# Patient Record
Sex: FEMALE | Race: WHITE | URBAN - METROPOLITAN AREA
[De-identification: names, ages, dates, MRNs, and addresses within clinical notes are randomized per-mention and may not be internally consistent; named-entity substitution may affect disease eponyms.]

---

## 2020-06-25 ENCOUNTER — OFFICE VISIT (OUTPATIENT)
Dept: URBAN - METROPOLITAN AREA SURGERY CENTER 16 | Facility: SURGERY CENTER | Age: 75
End: 2020-06-25

## 2020-09-04 ENCOUNTER — OFFICE VISIT (OUTPATIENT)
Dept: URBAN - METROPOLITAN AREA SURGERY CENTER 18 | Facility: SURGERY CENTER | Age: 75
End: 2020-09-04
Payer: MEDICARE

## 2020-09-04 ENCOUNTER — CLAIMS CREATED FROM THE CLAIM WINDOW (OUTPATIENT)
Dept: URBAN - METROPOLITAN AREA CLINIC 4 | Facility: CLINIC | Age: 75
End: 2020-09-04
Payer: MEDICARE

## 2020-09-04 DIAGNOSIS — K21.9 ACID REFLUX: ICD-10-CM

## 2020-09-04 DIAGNOSIS — K21.0 GASTRO-ESOPHAGEAL REFLUX DISEASE WITH ESOPHAGITIS: ICD-10-CM

## 2020-09-04 DIAGNOSIS — Z12.11 COLON CANCER SCREENING: ICD-10-CM

## 2020-09-04 DIAGNOSIS — D12.4 BENIGN NEOPLASM OF DESCENDING COLON: ICD-10-CM

## 2020-09-04 DIAGNOSIS — K22.8 COLUMNAR-LINED ESOPHAGUS: ICD-10-CM

## 2020-09-04 DIAGNOSIS — K31.89 OTHER DISEASES OF STOMACH AND DUODENUM: ICD-10-CM

## 2020-09-04 DIAGNOSIS — D10.30 BENIGN NEOPLASM OF UNSPECIFIED PART OF MOUTH: ICD-10-CM

## 2020-09-04 DIAGNOSIS — D12.2 BENIGN NEOPLASM OF ASCENDING COLON: ICD-10-CM

## 2020-09-04 DIAGNOSIS — K63.5 BENIGN COLON POLYP: ICD-10-CM

## 2020-09-04 PROCEDURE — 88342 IMHCHEM/IMCYTCHM 1ST ANTB: CPT | Performed by: PATHOLOGY

## 2020-09-04 PROCEDURE — 43239 EGD BIOPSY SINGLE/MULTIPLE: CPT | Performed by: INTERNAL MEDICINE

## 2020-09-04 PROCEDURE — G8907 PT DOC NO EVENTS ON DISCHARG: HCPCS | Performed by: INTERNAL MEDICINE

## 2020-09-04 PROCEDURE — 88305 TISSUE EXAM BY PATHOLOGIST: CPT | Performed by: PATHOLOGY

## 2020-09-04 PROCEDURE — 45385 COLONOSCOPY W/LESION REMOVAL: CPT | Performed by: INTERNAL MEDICINE

## 2020-09-04 PROCEDURE — 88312 SPECIAL STAINS GROUP 1: CPT | Performed by: PATHOLOGY

## 2020-09-04 PROCEDURE — 45380 COLONOSCOPY AND BIOPSY: CPT | Performed by: INTERNAL MEDICINE

## 2020-09-04 PROCEDURE — G9935 CANC NOT DETECTD DURING SRCN: HCPCS | Performed by: INTERNAL MEDICINE

## 2020-10-25 ENCOUNTER — LAB OUTSIDE AN ENCOUNTER (OUTPATIENT)
Dept: URBAN - METROPOLITAN AREA CLINIC 96 | Facility: CLINIC | Age: 75
End: 2020-10-25

## 2020-10-26 ENCOUNTER — OFFICE VISIT (OUTPATIENT)
Dept: URBAN - METROPOLITAN AREA CLINIC 96 | Facility: CLINIC | Age: 75
End: 2020-10-26
Payer: MEDICARE

## 2020-10-26 ENCOUNTER — WEB ENCOUNTER (OUTPATIENT)
Dept: URBAN - METROPOLITAN AREA CLINIC 96 | Facility: CLINIC | Age: 75
End: 2020-10-26

## 2020-10-26 DIAGNOSIS — Z80.0 FAMILY HISTORY OF PANCREATIC CANCER: ICD-10-CM

## 2020-10-26 DIAGNOSIS — Z86.010 PERSONAL HISTORY OF COLONIC POLYPS: ICD-10-CM

## 2020-10-26 DIAGNOSIS — K21.9 GASTROESOPHAGEAL REFLUX DISEASE WITHOUT ESOPHAGITIS: ICD-10-CM

## 2020-10-26 PROCEDURE — G9903 PT SCRN TBCO ID AS NON USER: HCPCS | Performed by: INTERNAL MEDICINE

## 2020-10-26 PROCEDURE — 99204 OFFICE O/P NEW MOD 45 MIN: CPT | Performed by: INTERNAL MEDICINE

## 2020-10-26 PROCEDURE — G8427 DOCREV CUR MEDS BY ELIG CLIN: HCPCS | Performed by: INTERNAL MEDICINE

## 2020-10-26 PROCEDURE — G8420 CALC BMI NORM PARAMETERS: HCPCS | Performed by: INTERNAL MEDICINE

## 2020-10-26 PROCEDURE — G8482 FLU IMMUNIZE ORDER/ADMIN: HCPCS | Performed by: INTERNAL MEDICINE

## 2020-10-26 PROCEDURE — 3017F COLORECTAL CA SCREEN DOC REV: CPT | Performed by: INTERNAL MEDICINE

## 2020-10-26 RX ORDER — PANTOPRAZOLE SODIUM 20 MG/1
1 TABLET TABLET, DELAYED RELEASE ORAL ONCE A DAY
Qty: 30 | OUTPATIENT
Start: 2020-10-26

## 2020-10-26 NOTE — HPI-TODAY'S VISIT:
This is a 75-year-old female referred by Dr. Zamora for follow-up of some GI issues.  I met the patient February 2020 for colon cancer screening.  She also had some history of indigestion and heartburn.  She had a CT scan done in September due to some indigestion and her dad had pancreatic cancer.  Her son also had kidney cancer but survived this.  The CT showed possible inflammation of the stomach so Dr. Zamora wanted her to have an upper endoscopy at the same time of colonoscopy.  Patient had denied symptoms of GERD or dysphagia or heartburn.  Patient had an EGD on September 4, 2020 and this revealed a 4 mm nodule in the proximal esophagus this was removed.  The esophagus was otherwise normal.  There was some mild gastric erythema.  Biopsies were taken.  Colonoscopy was done on the same day and revealed internal hemorrhoids as well as a 6 mm ascending colon polyp that was removed with a cold snare and a 4 mm descending polyp that was removed with cold biopsy.  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  Her polyps were hyperplastic.  For these findings I recommended a follow-up colonoscopy in 5 years or she would be due in September 2025.  Patient did have some reflux changes so I did recommend that she follow-up with me. Pt has mutiple rescue dogs and has accupuncture and chirocpractic care.

## 2020-11-18 ENCOUNTER — ERX REFILL RESPONSE (OUTPATIENT)
Dept: URBAN - METROPOLITAN AREA CLINIC 96 | Facility: CLINIC | Age: 75
End: 2020-11-18

## 2020-11-18 RX ORDER — PANTOPRAZOLE SODIUM 20 MG/1
1 TABLET TABLET, DELAYED RELEASE ORAL ONCE A DAY
Qty: 30 | Refills: 0

## 2020-12-13 ENCOUNTER — ERX REFILL RESPONSE (OUTPATIENT)
Dept: URBAN - METROPOLITAN AREA CLINIC 96 | Facility: CLINIC | Age: 75
End: 2020-12-13

## 2020-12-13 RX ORDER — PANTOPRAZOLE SODIUM 20 MG/1
1 TABLET TABLET, DELAYED RELEASE ORAL ONCE A DAY
Qty: 30 | Refills: 0

## 2021-01-06 ENCOUNTER — ERX REFILL RESPONSE (OUTPATIENT)
Dept: URBAN - METROPOLITAN AREA CLINIC 96 | Facility: CLINIC | Age: 76
End: 2021-01-06

## 2021-01-06 RX ORDER — PANTOPRAZOLE SODIUM 40 MG/1
1 TABLET TABLET, DELAYED RELEASE ORAL ONCE A DAY
Qty: 90 TABLET | Refills: 3

## 2021-04-27 ENCOUNTER — LAB OUTSIDE AN ENCOUNTER (OUTPATIENT)
Dept: URBAN - METROPOLITAN AREA CLINIC 96 | Facility: CLINIC | Age: 76
End: 2021-04-27

## 2021-04-28 ENCOUNTER — OFFICE VISIT (OUTPATIENT)
Dept: URBAN - METROPOLITAN AREA CLINIC 96 | Facility: CLINIC | Age: 76
End: 2021-04-28
Payer: MEDICARE

## 2021-04-28 DIAGNOSIS — K59.00 CONSTIPATION, UNSPECIFIED CONSTIPATION TYPE: ICD-10-CM

## 2021-04-28 DIAGNOSIS — Z86.010 PERSONAL HISTORY OF COLONIC POLYPS: ICD-10-CM

## 2021-04-28 DIAGNOSIS — Z80.0 FAMILY HISTORY OF PANCREATIC CANCER: ICD-10-CM

## 2021-04-28 DIAGNOSIS — K21.9 GASTROESOPHAGEAL REFLUX DISEASE WITHOUT ESOPHAGITIS: ICD-10-CM

## 2021-04-28 PROCEDURE — 99204 OFFICE O/P NEW MOD 45 MIN: CPT | Performed by: INTERNAL MEDICINE

## 2021-04-28 RX ORDER — PANTOPRAZOLE SODIUM 20 MG/1
1 TABLET TABLET, DELAYED RELEASE ORAL ONCE A DAY
Qty: 30 | Refills: 0 | Status: ACTIVE | COMMUNITY

## 2021-04-28 RX ORDER — PANTOPRAZOLE SODIUM 40 MG/1
1 TABLET TABLET, DELAYED RELEASE ORAL ONCE A DAY
Qty: 90 TABLET | Refills: 3 | Status: ACTIVE | COMMUNITY

## 2021-04-28 NOTE — HPI-TODAY'S VISIT:
This is a 75-year-old female referred by Dr. Zamora for follow-up of some GI issues and a copy of this note will be sent to the referring physician. I met the patient February 2020 for colon cancer screening.  She also had some history of indigestion and heartburn.  She had a CT scan done in September due to some indigestion and her dad had pancreatic cancer.  Her son also had kidney cancer but survived this.  The CT showed possible inflammation of the stomach so Dr. Zamora wanted her to have an upper endoscopy at the same time of colonoscopy.  Patient had denied symptoms of GERD or dysphagia or heartburn.  Patient had an EGD on September 4, 2020 and this revealed a 4 mm nodule in the proximal esophagus this was removed.  The esophagus was otherwise normal.  There was some mild gastric erythema.  Biopsies were taken.  Colonoscopy was done on the same day and revealed internal hemorrhoids as well as a 6 mm ascending colon polyp that was removed with a cold snare and a 4 mm descending polyp that was removed with cold biopsy.  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  Her polyps were hyperplastic.  For these findings I recommended a follow-up colonoscopy in 5 years or she would be due in September 2025.  Patient did have some reflux changes so I did recommend that she follow-up with me. Pt has mutiple rescue dogs and has accupuncture and chirocpractic care.

## 2022-07-22 ENCOUNTER — OFFICE VISIT (OUTPATIENT)
Dept: URBAN - METROPOLITAN AREA TELEHEALTH 2 | Facility: TELEHEALTH | Age: 77
End: 2022-07-22
Payer: MEDICARE

## 2022-07-22 VITALS — HEIGHT: 61 IN | WEIGHT: 102 LBS | BODY MASS INDEX: 19.26 KG/M2

## 2022-07-22 DIAGNOSIS — K59.09 CHANGE IN BOWEL MOVEMENTS INTERMITTENT CONSTIPATION. URGENCY IN THE MORNING.: ICD-10-CM

## 2022-07-22 DIAGNOSIS — K59.00 CONSTIPATION, UNSPECIFIED CONSTIPATION TYPE: ICD-10-CM

## 2022-07-22 DIAGNOSIS — Z80.0 FAMILY HISTORY OF PANCREATIC CANCER: ICD-10-CM

## 2022-07-22 DIAGNOSIS — Z86.010 PERSONAL HISTORY OF COLONIC POLYPS: ICD-10-CM

## 2022-07-22 DIAGNOSIS — K21.9 GASTROESOPHAGEAL REFLUX DISEASE WITHOUT ESOPHAGITIS: ICD-10-CM

## 2022-07-22 PROCEDURE — 99213 OFFICE O/P EST LOW 20 MIN: CPT | Performed by: PHYSICIAN ASSISTANT

## 2022-07-22 RX ORDER — PANTOPRAZOLE SODIUM 40 MG/1
1 TABLET TABLET, DELAYED RELEASE ORAL ONCE A DAY
Qty: 90 TABLET | Refills: 3 | Status: ON HOLD | COMMUNITY

## 2022-07-22 RX ORDER — PANTOPRAZOLE SODIUM 20 MG/1
1 TABLET TABLET, DELAYED RELEASE ORAL ONCE A DAY
Qty: 30 | Refills: 0 | Status: ON HOLD | COMMUNITY

## 2022-07-22 NOTE — HPI-TODAY'S VISIT:
This is a 77yoF who presents for eval of constipation, last seen 4/2021. EGD 9/4/2020  revealed a 4 mm squamous papilloma in the proximal esophagus this was removed and gastritis. Bx neg celiac and HP but showed some reflux esophagitis.  She took PPI short term and sx resolved. She is off reflux meds and denies GERD sx--controls this with diet. No dysphagia, N/V.  She had a Colonoscopy 9/2020 that revealed internal hemorrhoids as well as a 6 mm ascending colon polyp and a 4 mm descending polyp-both hyperplastic, due in September 2025 for surveillance.  Has hx of mild constipation and used to use miralax prn but she stopped using this (for no reason) and in April had no BM for 3 days and she went to the ER [facility] and I reviewed the CT scan which showed stool in the rectum and she had to be disimpacted. She was told to use colace which she did for only a few days and then has not been taking anything since. At baseline takes BMs every day but small and hard stools and incomplete evacuation. Admits diet low in fiber and does not want to work on this as very picky. She notes earlier this week she went 3 day without BMs and then took a laxative and an enema and was able to have a BM. Denies hematochezia or melena . She has bloating with the constipation. When she used to take miralax it helped.   She has 2 kids--vaginally births--+episiotomy--denies urinary leakage  She has had her thyroid checked with PCP

## 2023-01-30 ENCOUNTER — ERX REFILL RESPONSE (OUTPATIENT)
Dept: URBAN - METROPOLITAN AREA CLINIC 96 | Facility: CLINIC | Age: 78
End: 2023-01-30

## 2023-01-30 ENCOUNTER — LAB OUTSIDE AN ENCOUNTER (OUTPATIENT)
Dept: URBAN - METROPOLITAN AREA CLINIC 96 | Facility: CLINIC | Age: 78
End: 2023-01-30

## 2023-01-30 ENCOUNTER — OFFICE VISIT (OUTPATIENT)
Dept: URBAN - METROPOLITAN AREA CLINIC 96 | Facility: CLINIC | Age: 78
End: 2023-01-30
Payer: MEDICARE

## 2023-01-30 VITALS
BODY MASS INDEX: 19.41 KG/M2 | DIASTOLIC BLOOD PRESSURE: 64 MMHG | HEIGHT: 61 IN | TEMPERATURE: 98.1 F | WEIGHT: 102.8 LBS | SYSTOLIC BLOOD PRESSURE: 119 MMHG | HEART RATE: 78 BPM

## 2023-01-30 DIAGNOSIS — K59.04 CHRONIC IDIOPATHIC CONSTIPATION: ICD-10-CM

## 2023-01-30 DIAGNOSIS — R10.13 EPIGASTRIC PAIN: ICD-10-CM

## 2023-01-30 DIAGNOSIS — Z86.010 PERSONAL HISTORY OF COLONIC POLYPS: ICD-10-CM

## 2023-01-30 DIAGNOSIS — Z80.0 FAMILY HISTORY OF PANCREATIC CANCER: ICD-10-CM

## 2023-01-30 DIAGNOSIS — K21.9 GASTROESOPHAGEAL REFLUX DISEASE WITHOUT ESOPHAGITIS: ICD-10-CM

## 2023-01-30 PROBLEM — 428283002: Status: ACTIVE | Noted: 2020-10-25

## 2023-01-30 PROBLEM — 79922009: Status: ACTIVE | Noted: 2023-01-30

## 2023-01-30 PROBLEM — 266435005: Status: ACTIVE | Noted: 2020-10-26

## 2023-01-30 PROBLEM — 14760008: Status: ACTIVE | Noted: 2021-04-28

## 2023-01-30 PROBLEM — 82934008: Status: ACTIVE | Noted: 2023-01-30

## 2023-01-30 PROCEDURE — 99204 OFFICE O/P NEW MOD 45 MIN: CPT | Performed by: INTERNAL MEDICINE

## 2023-01-30 RX ORDER — LUBIPROSTONE 8 UG/1
1 CAPSULE CAPSULE, GELATIN COATED ORAL TWICE A DAY
Qty: 180 CAPSULE | Refills: 1 | OUTPATIENT
Start: 2023-01-30 | End: 2023-07-29

## 2023-01-30 RX ORDER — PANTOPRAZOLE SODIUM 20 MG/1
1 TABLET TABLET, DELAYED RELEASE ORAL ONCE A DAY
Qty: 30 | Refills: 0 | COMMUNITY

## 2023-01-30 RX ORDER — PANTOPRAZOLE SODIUM 40 MG/1
1 TABLET TABLET, DELAYED RELEASE ORAL ONCE A DAY
Qty: 90 TABLET | Refills: 3 | COMMUNITY

## 2023-01-30 RX ORDER — LUBIPROSTONE 8 UG/1
1 CAPSULE CAPSULE, GELATIN COATED ORAL TWICE A DAY
Qty: 180 CAPSULE | Refills: 1 | OUTPATIENT

## 2023-01-30 NOTE — HPI-TODAY'S VISIT:
This is a 77yoF who presents for Gi f/u and a copy will be sent to the ref provider.  Pt was seen by our PA Kera in 7/2022 for severe constipation.  EGD 9/4/2020  revealed a 4 mm squamous papilloma in the proximal esophagus this was removed and gastritis. Bx neg celiac and HP but showed some reflux esophagitis.  She took PPI short term and sx resolved. She is off reflux meds and denies GERD sx--controls this with diet. No dysphagia, N/V. She had a Colonoscopy 9/2020 that revealed internal hemorrhoids as well as a 6 mm ascending colon polyp and a 4 mm descending polyp-both hyperplastic, due in September 2025 for surveillance. Has hx of mild constipation and used to use miralax prn but she stopped using this (for no reason) and in April had no BM for 3 days and she went to the ER at [facility] and I reviewed the CT scan which showed stool in the rectum and she had to be disimpacted. She was told to use colace which she did for only a few days and then has not been taking anything since. At baseline takes BMs every day but small and hard stools and incomplete evacuation. Admits diet low in fiber and does not want to work on this as very picky. She notes earlier this week she went 3 day without BMs and then took a laxative and an enema and was able to have a BM. Denies hematochezia or melena . She has bloating with the constipation. When she used to take miralax it helped.  She has 2 kids--vaginally births--+episiotomy--denies urinary leakage  She has had her thyroid checked with PCP Pt was seen by Kera in 7/2022 and wanted to try miralax for her constip issues. ARM was mentioned. Pt brought in an MRI Report.  This was ordered by Dr. Zamora for abdominal pain and compared to the CT that Dr. Zamora did on April 8, 2022.  MRI shows no acute process in the abdomen the stomach is mildly thickened but this may be due to under distention. Pt takes miralx and metamucil-  Pt had chr indigestion and took pantoprazole and then went off it.

## 2023-02-14 ENCOUNTER — TELEPHONE ENCOUNTER (OUTPATIENT)
Dept: URBAN - METROPOLITAN AREA CLINIC 96 | Facility: CLINIC | Age: 78
End: 2023-02-14

## 2023-02-17 ENCOUNTER — CLAIMS CREATED FROM THE CLAIM WINDOW (OUTPATIENT)
Dept: URBAN - METROPOLITAN AREA CLINIC 4 | Facility: CLINIC | Age: 78
End: 2023-02-17
Payer: MEDICARE

## 2023-02-17 ENCOUNTER — CLAIMS CREATED FROM THE CLAIM WINDOW (OUTPATIENT)
Dept: URBAN - METROPOLITAN AREA SURGERY CENTER 18 | Facility: SURGERY CENTER | Age: 78
End: 2023-02-17

## 2023-02-17 ENCOUNTER — CLAIMS CREATED FROM THE CLAIM WINDOW (OUTPATIENT)
Dept: URBAN - METROPOLITAN AREA SURGERY CENTER 18 | Facility: SURGERY CENTER | Age: 78
End: 2023-02-17
Payer: MEDICARE

## 2023-02-17 ENCOUNTER — TELEPHONE ENCOUNTER (OUTPATIENT)
Dept: URBAN - METROPOLITAN AREA CLINIC 92 | Facility: CLINIC | Age: 78
End: 2023-02-17

## 2023-02-17 DIAGNOSIS — K29.70 GASTRITIS, UNSPECIFIED, WITHOUT BLEEDING: ICD-10-CM

## 2023-02-17 DIAGNOSIS — K22.89 DILATATION OF ESOPHAGUS: ICD-10-CM

## 2023-02-17 DIAGNOSIS — K29.60 ADENOPAPILLOMATOSIS GASTRICA: ICD-10-CM

## 2023-02-17 PROCEDURE — 88312 SPECIAL STAINS GROUP 1: CPT | Performed by: PATHOLOGY

## 2023-02-17 PROCEDURE — G8907 PT DOC NO EVENTS ON DISCHARG: HCPCS | Performed by: INTERNAL MEDICINE

## 2023-02-17 PROCEDURE — 43239 EGD BIOPSY SINGLE/MULTIPLE: CPT | Performed by: INTERNAL MEDICINE

## 2023-02-17 PROCEDURE — 88305 TISSUE EXAM BY PATHOLOGIST: CPT | Performed by: PATHOLOGY

## 2023-02-17 RX ORDER — LUBIPROSTONE 8 UG/1
1 CAPSULE CAPSULE, GELATIN COATED ORAL TWICE A DAY
Qty: 180 CAPSULE | Refills: 3 | OUTPATIENT
Start: 2023-02-17 | End: 2024-02-12

## 2023-02-17 RX ORDER — LUBIPROSTONE 8 UG/1
1 CAPSULE CAPSULE, GELATIN COATED ORAL TWICE A DAY
Qty: 180 CAPSULE | Refills: 1 | Status: ACTIVE | COMMUNITY

## 2023-02-17 RX ORDER — PANTOPRAZOLE SODIUM 20 MG/1
1 TABLET TABLET, DELAYED RELEASE ORAL ONCE A DAY
Qty: 30 | Refills: 0 | COMMUNITY

## 2023-02-17 RX ORDER — PANTOPRAZOLE SODIUM 40 MG/1
1 TABLET TABLET, DELAYED RELEASE ORAL ONCE A DAY
Qty: 90 TABLET | Refills: 3 | COMMUNITY

## 2023-04-04 ENCOUNTER — DASHBOARD ENCOUNTERS (OUTPATIENT)
Age: 78
End: 2023-04-04

## 2023-04-05 ENCOUNTER — OFFICE VISIT (OUTPATIENT)
Dept: URBAN - METROPOLITAN AREA CLINIC 96 | Facility: CLINIC | Age: 78
End: 2023-04-05
Payer: MEDICARE

## 2023-04-05 VITALS — WEIGHT: 102 LBS | HEIGHT: 61 IN | BODY MASS INDEX: 19.26 KG/M2

## 2023-04-05 DIAGNOSIS — Z80.0 FAMILY HISTORY OF PANCREATIC CANCER: ICD-10-CM

## 2023-04-05 DIAGNOSIS — Z86.010 PERSONAL HISTORY OF COLONIC POLYPS: ICD-10-CM

## 2023-04-05 DIAGNOSIS — K59.04 CHRONIC IDIOPATHIC CONSTIPATION: ICD-10-CM

## 2023-04-05 DIAGNOSIS — R10.13 EPIGASTRIC PAIN: ICD-10-CM

## 2023-04-05 DIAGNOSIS — K21.9 GASTROESOPHAGEAL REFLUX DISEASE WITHOUT ESOPHAGITIS: ICD-10-CM

## 2023-04-05 PROCEDURE — 99204 OFFICE O/P NEW MOD 45 MIN: CPT | Performed by: INTERNAL MEDICINE

## 2023-04-05 RX ORDER — LUBIPROSTONE 8 UG/1
1 CAPSULE CAPSULE, GELATIN COATED ORAL TWICE A DAY
Qty: 180 CAPSULE | Refills: 1 | OUTPATIENT

## 2023-04-05 RX ORDER — PANTOPRAZOLE SODIUM 20 MG/1
1 TABLET TABLET, DELAYED RELEASE ORAL ONCE A DAY
Qty: 30 | Refills: 0 | Status: ACTIVE | COMMUNITY

## 2023-04-05 RX ORDER — LUBIPROSTONE 8 UG/1
1 CAPSULE CAPSULE, GELATIN COATED ORAL TWICE A DAY
Qty: 180 CAPSULE | Refills: 1 | Status: ACTIVE | COMMUNITY

## 2023-04-05 RX ORDER — PANTOPRAZOLE SODIUM 40 MG/1
1 TABLET TABLET, DELAYED RELEASE ORAL ONCE A DAY
Qty: 90 TABLET | Refills: 3 | Status: ACTIVE | COMMUNITY

## 2023-04-05 RX ORDER — LUBIPROSTONE 8 UG/1
1 CAPSULE CAPSULE, GELATIN COATED ORAL TWICE A DAY
Qty: 180 CAPSULE | Refills: 3 | Status: ACTIVE | COMMUNITY
Start: 2023-02-17 | End: 2024-02-12

## 2023-04-05 NOTE — HPI-TODAY'S VISIT:
This is a 78yoF who presents for Gi f/u and a copy will be sent to the ref provider, Dr Yuki Zamora. Pt was seen by our PA Kera in 7/2022 for severe constipation.  EGD 9/4/2020  revealed a 4 mm squamous papilloma in the proximal esophagus this was removed and gastritis. Bx neg celiac and HP but showed some reflux esophagitis.  She took PPI short term and sx resolved. She is off reflux meds and denies GERD sx--controls this with diet. No dysphagia, N/V. She had a Colonoscopy 9/2020 that revealed internal hemorrhoids as well as a 6 mm ascending colon polyp and a 4 mm descending polyp-both hyperplastic, due in September 2025 for surveillance. Has hx of mild constipation and used to use miralax prn but she stopped using this (for no reason) and in April had no BM for 3 days and she went to the ER at [facility] and I reviewed the CT scan which showed stool in the rectum and she had to be disimpacted. She was told to use colace which she did for only a few days and then has not been taking anything since. At baseline takes BMs every day but small and hard stools and incomplete evacuation. Admits diet low in fiber and does not want to work on this as very picky. She notes earlier this week she went 3 day without BMs and then took a laxative and an enema and was able to have a BM. Denies hematochezia or melena . She has bloating with the constipation. When she used to take miralax it helped.  She has 2 kids--vaginally births--+episiotomy--denies urinary leakage  She has had her thyroid checked with PCP Pt was seen by Kera in 7/2022 and wanted to try miralax for her constip issues. ARM was mentioned. Pt brought in an MRI Report.  This was ordered by Dr. Zamora for abdominal pain and compared to the CT that Dr. Zamora did on April 8, 2022.  MRI shows no acute process in the abdomen the stomach is mildly thickened but this may be due to under distention. Pt takes miralx and metamucil-  Pt had chr indigestion and took pantoprazole and then went off it. I did an EGD on the patient on February 17, 2023 to evaluate due to an MRI that mentioned a thickened stomach.  This revealed esophagitis otherwise normal exam.  Biopsies were sent.  Pathology showed chronic gastritis of the antrum but no H. pylori, chronic gastritis of the body but no H. pylori and esophageal biopsy showed no abnormalities both in the distal and mid areas of the esophagus. Pt got 90 days of amitiza 8ug so using and she goes once a day but its not complete.  She denies  reflux at all, has not wanted to be on PPIs and nows pros and cons.

## 2023-05-25 ENCOUNTER — TELEPHONE ENCOUNTER (OUTPATIENT)
Dept: URBAN - METROPOLITAN AREA MEDICAL CENTER 28 | Facility: MEDICAL CENTER | Age: 78
End: 2023-05-25

## 2023-06-01 ENCOUNTER — OFFICE VISIT (OUTPATIENT)
Dept: URBAN - METROPOLITAN AREA MEDICAL CENTER 28 | Facility: MEDICAL CENTER | Age: 78
End: 2023-06-01

## 2023-06-02 ENCOUNTER — ERX REFILL RESPONSE (OUTPATIENT)
Dept: URBAN - METROPOLITAN AREA CLINIC 92 | Facility: CLINIC | Age: 78
End: 2023-06-02

## 2023-06-02 RX ORDER — LUBIPROSTONE 8 UG/1
1 CAPSULE CAPSULE, GELATIN COATED ORAL TWICE A DAY
Qty: 180 CAPSULE | Refills: 1 | OUTPATIENT

## 2024-07-09 ENCOUNTER — LAB OUTSIDE AN ENCOUNTER (OUTPATIENT)
Dept: URBAN - METROPOLITAN AREA CLINIC 96 | Facility: CLINIC | Age: 79
End: 2024-07-09

## 2024-07-10 ENCOUNTER — LAB OUTSIDE AN ENCOUNTER (OUTPATIENT)
Dept: URBAN - METROPOLITAN AREA CLINIC 96 | Facility: CLINIC | Age: 79
End: 2024-07-10

## 2024-07-10 ENCOUNTER — OFFICE VISIT (OUTPATIENT)
Dept: URBAN - METROPOLITAN AREA CLINIC 96 | Facility: CLINIC | Age: 79
End: 2024-07-10
Payer: MEDICARE

## 2024-07-10 VITALS
HEIGHT: 61 IN | HEART RATE: 66 BPM | TEMPERATURE: 97.7 F | WEIGHT: 103 LBS | SYSTOLIC BLOOD PRESSURE: 143 MMHG | BODY MASS INDEX: 19.45 KG/M2 | DIASTOLIC BLOOD PRESSURE: 70 MMHG

## 2024-07-10 DIAGNOSIS — R10.84 GENERALIZED ABDOMINAL PAIN: ICD-10-CM

## 2024-07-10 DIAGNOSIS — Z80.0 FAMILY HISTORY OF PANCREATIC CANCER: ICD-10-CM

## 2024-07-10 DIAGNOSIS — R10.13 EPIGASTRIC PAIN: ICD-10-CM

## 2024-07-10 DIAGNOSIS — K59.04 CHRONIC IDIOPATHIC CONSTIPATION: ICD-10-CM

## 2024-07-10 DIAGNOSIS — K21.9 GASTROESOPHAGEAL REFLUX DISEASE WITHOUT ESOPHAGITIS: ICD-10-CM

## 2024-07-10 DIAGNOSIS — Z86.010 PERSONAL HISTORY OF COLONIC POLYPS: ICD-10-CM

## 2024-07-10 PROCEDURE — 99204 OFFICE O/P NEW MOD 45 MIN: CPT | Performed by: INTERNAL MEDICINE

## 2024-07-10 RX ORDER — PANTOPRAZOLE SODIUM 40 MG/1
1 TABLET TABLET, DELAYED RELEASE ORAL ONCE A DAY
Qty: 90 TABLET | Refills: 3 | Status: ON HOLD | COMMUNITY

## 2024-07-10 RX ORDER — LUBIPROSTONE 8 UG/1
1 CAPSULE CAPSULE, GELATIN COATED ORAL TWICE A DAY
Qty: 180 CAPSULE | Refills: 1 | Status: ON HOLD | COMMUNITY

## 2024-07-10 RX ORDER — LUBIPROSTONE 8 UG/1
1 CAPSULE CAPSULE, GELATIN COATED ORAL TWICE A DAY
Qty: 180 CAPSULE | Refills: 1 | OUTPATIENT

## 2024-07-10 RX ORDER — MELOXICAM 15 MG/1
1 TABLET TABLET ORAL ONCE A DAY
Status: ACTIVE | COMMUNITY

## 2024-07-10 NOTE — HPI-TODAY'S VISIT:
This is a 79yoF who presents for Gi f/u and a copy will be sent to the ref provider, Dr Yuki Zamora. Pt with hx of severe constipation and gerd was last seen in april 2023 and was on amitiza at the time. We discussed doing ARM study to r/o pelvic floor dysfunction. EGD 9/4/2020  revealed a 4 mm squamous papilloma in the proximal esophagus this was removed and gastritis. Bx neg celiac and HP but showed some reflux esophagitis.  She took PPI short term and sx resolved. She is off reflux meds and denies GERD sx--controls this with diet. No dysphagia, N/V. She had a Colonoscopy 9/2020 that revealed internal hemorrhoids as well as a 6 mm ascending colon polyp and a 4 mm descending polyp-both hyperplastic, due in September 2025 for surveillance. I did fu EGD on the patient on February 17, 2023 to evaluate due to an MRI that mentioned a thickened stomach.  This revealed esophagitis otherwise normal exam.  Biopsies were sent.  Pathology showed chronic gastritis of the antrum but no H. pylori, chronic gastritis of the body but no H. pylori and esophageal biopsy showed no abnormalities both in the distal and mid areas of the esophagus.  Pt takes meloxicam for her knee. Pt admits to having stopped amitiza and she is bloated and passing hard stools. She thinks amitiza works but has diarrhea sometimes from it so stopped. Pts dad had pancreatic cancer. Pt has thought about having a CT scan just to ensure no other issues. Pts son is a radiologist and had kidney stone. Pt had genetic testing and negative. Pt had failed colace and miralax.

## 2024-07-11 ENCOUNTER — TELEPHONE ENCOUNTER (OUTPATIENT)
Dept: URBAN - METROPOLITAN AREA CLINIC 96 | Facility: CLINIC | Age: 79
End: 2024-07-11

## 2024-07-15 ENCOUNTER — LAB OUTSIDE AN ENCOUNTER (OUTPATIENT)
Dept: URBAN - METROPOLITAN AREA CLINIC 96 | Facility: CLINIC | Age: 79
End: 2024-07-15

## 2024-07-15 LAB
CREATININE POC: 0.7
PERFORMING LAB: (no result)

## 2024-07-16 ENCOUNTER — TELEPHONE ENCOUNTER (OUTPATIENT)
Dept: URBAN - METROPOLITAN AREA CLINIC 96 | Facility: CLINIC | Age: 79
End: 2024-07-16

## 2024-07-18 ENCOUNTER — TELEPHONE ENCOUNTER (OUTPATIENT)
Dept: URBAN - METROPOLITAN AREA CLINIC 96 | Facility: CLINIC | Age: 79
End: 2024-07-18

## 2024-07-18 RX ORDER — LINACLOTIDE 72 UG/1
1 CAPSULE AT LEAST 30 MINUTES BEFORE THE FIRST MEAL OF THE DAY ON AN EMPTY STOMACH CAPSULE, GELATIN COATED ORAL ONCE A DAY
Qty: 90 CAPSULES | Refills: 3 | OUTPATIENT
Start: 2024-07-18 | End: 2025-07-13

## 2024-07-19 ENCOUNTER — TELEPHONE ENCOUNTER (OUTPATIENT)
Dept: URBAN - METROPOLITAN AREA CLINIC 96 | Facility: CLINIC | Age: 79
End: 2024-07-19

## 2024-07-22 ENCOUNTER — TELEPHONE ENCOUNTER (OUTPATIENT)
Dept: URBAN - METROPOLITAN AREA CLINIC 96 | Facility: CLINIC | Age: 79
End: 2024-07-22

## 2024-10-10 ENCOUNTER — OFFICE VISIT (OUTPATIENT)
Dept: URBAN - METROPOLITAN AREA CLINIC 92 | Facility: CLINIC | Age: 79
End: 2024-10-10

## 2024-11-06 ENCOUNTER — OFFICE VISIT (OUTPATIENT)
Dept: URBAN - METROPOLITAN AREA TELEHEALTH 2 | Facility: TELEHEALTH | Age: 79
End: 2024-11-06
Payer: MEDICARE

## 2024-11-06 VITALS — BODY MASS INDEX: 19.45 KG/M2 | WEIGHT: 103 LBS | HEIGHT: 61 IN

## 2024-11-06 DIAGNOSIS — K59.04 CHRONIC IDIOPATHIC CONSTIPATION: ICD-10-CM

## 2024-11-06 DIAGNOSIS — Z80.0 FAMILY HISTORY OF PANCREATIC CANCER: ICD-10-CM

## 2024-11-06 DIAGNOSIS — Z86.0102 HISTORY OF HYPERPLASTIC POLYP OF COLON: ICD-10-CM

## 2024-11-06 DIAGNOSIS — K21.9 GASTROESOPHAGEAL REFLUX DISEASE WITHOUT ESOPHAGITIS: ICD-10-CM

## 2024-11-06 PROCEDURE — 99204 OFFICE O/P NEW MOD 45 MIN: CPT | Performed by: INTERNAL MEDICINE

## 2024-11-06 PROCEDURE — 99214 OFFICE O/P EST MOD 30 MIN: CPT | Performed by: INTERNAL MEDICINE

## 2024-11-06 RX ORDER — MELOXICAM 15 MG/1
1 TABLET TABLET ORAL ONCE A DAY
Status: ACTIVE | COMMUNITY

## 2024-11-06 RX ORDER — PANTOPRAZOLE SODIUM 40 MG/1
1 TABLET TABLET, DELAYED RELEASE ORAL ONCE A DAY
Qty: 90 TABLET | Refills: 3 | Status: ACTIVE | COMMUNITY

## 2024-11-06 RX ORDER — LINACLOTIDE 72 UG/1
1 CAPSULE AT LEAST 30 MINUTES BEFORE THE FIRST MEAL OF THE DAY ON AN EMPTY STOMACH CAPSULE, GELATIN COATED ORAL ONCE A DAY
Qty: 90 CAPSULES | Refills: 3 | Status: ACTIVE | COMMUNITY
Start: 2024-07-18 | End: 2025-07-13

## 2024-11-06 NOTE — HPI-TODAY'S VISIT:
This is a 79yoF who presents for Gi f/u and a copy will be sent to the ref provider, Dr Yuki Zamora. Pt with hx of severe constipation and gerd was last seen in april 2023 and was on amitiza at the time. We discussed doing ARM study to r/o pelvic floor dysfunction. EGD 9/4/2020  revealed a 4 mm squamous papilloma in the proximal esophagus this was removed and gastritis. Bx neg celiac and HP but showed some reflux esophagitis.  She took PPI short term and sx resolved. She is off reflux meds and denies GERD sx--controls this with diet. No dysphagia, N/V. She had a Colonoscopy 9/2020 that revealed internal hemorrhoids as well as a 6 mm ascending colon polyp and a 4 mm descending polyp-both hyperplastic, due in September 2025 for surveillance. I did fu EGD on the patient on February 17, 2023 to evaluate due to an MRI that mentioned a thickened stomach.  This revealed esophagitis otherwise normal exam.  Biopsies were sent.  Pathology showed chronic gastritis of the antrum but no H. pylori, chronic gastritis of the body but no H. pylori and esophageal biopsy showed no abnormalities both in the distal and mid areas of the esophagus.  Pt takes meloxicam for her knee. Pt admits to having stopped amitiza and she is bloated and passing hard stools.  She thinks amitiza works but has diarrhea sometimes from it so stopped. Pts dad had pancreatic cancer. Pt has thought about having a CT scan just to ensure no other issues. Pts son is a radiologist and had kidney stone. Pt had genetic testing and negative. Pt had failed colace and miralax. Patient had a CT scan done on July 15, 2024 and this revealed no abnormalities except for some degenerative disc disease of the lumbar spine.  Patient was to try Linzess after her last visit because she felt that Amitiza was not working and she failed over-the-counter meds for her chronic constipation. Pt did not get lubiprostone 8ug and she takes it once a day and goes with harder stools (debora) but at least she is evacuating more than when she tool otc laxatives. Pt goes everyday. Pt is taking meloxicam for arthritis and she is on panto. Pt takes meloxicam for her arthritis.

## 2025-03-21 ENCOUNTER — LAB OUTSIDE AN ENCOUNTER (OUTPATIENT)
Dept: URBAN - METROPOLITAN AREA CLINIC 96 | Facility: CLINIC | Age: 80
End: 2025-03-21

## 2025-03-21 ENCOUNTER — OFFICE VISIT (OUTPATIENT)
Dept: URBAN - METROPOLITAN AREA CLINIC 96 | Facility: CLINIC | Age: 80
End: 2025-03-21
Payer: MEDICARE

## 2025-03-21 DIAGNOSIS — K21.9 GASTROESOPHAGEAL REFLUX DISEASE WITHOUT ESOPHAGITIS: ICD-10-CM

## 2025-03-21 DIAGNOSIS — R10.84 GENERALIZED ABDOMINAL PAIN: ICD-10-CM

## 2025-03-21 DIAGNOSIS — K59.04 CHRONIC IDIOPATHIC CONSTIPATION: ICD-10-CM

## 2025-03-21 DIAGNOSIS — Z86.0102 PERSONAL HISTORY OF HYPERPLASTIC COLON POLYPS: ICD-10-CM

## 2025-03-21 DIAGNOSIS — Z80.0 FAMILY HISTORY OF PANCREATIC CANCER: ICD-10-CM

## 2025-03-21 PROCEDURE — 99214 OFFICE O/P EST MOD 30 MIN: CPT | Performed by: INTERNAL MEDICINE

## 2025-03-21 PROCEDURE — 99204 OFFICE O/P NEW MOD 45 MIN: CPT | Performed by: INTERNAL MEDICINE

## 2025-03-21 RX ORDER — PANTOPRAZOLE SODIUM 40 MG/1
1 TABLET TABLET, DELAYED RELEASE ORAL ONCE A DAY
Qty: 90 TABLET | Refills: 3 | Status: ACTIVE | COMMUNITY

## 2025-03-21 RX ORDER — MELOXICAM 15 MG/1
1 TABLET TABLET ORAL ONCE A DAY
Status: ACTIVE | COMMUNITY

## 2025-03-21 NOTE — HPI-TODAY'S VISIT:
This is a 79yoF who presents for Gi f/u and a copy will be sent to the ref provider, Dr Yuki Zamora. Pt with hx of severe constipation and gerd was last seen in april 2023 and was on amitiza at the time. We discussed doing ARM study to r/o pelvic floor dysfunction. EGD 9/4/2020  revealed a 4 mm squamous papilloma in the proximal esophagus this was removed and gastritis. Bx neg celiac and HP but showed some reflux esophagitis.  She took PPI short term and sx resolved. She is off reflux meds and denies GERD sx--controls this with diet. No dysphagia, N/V. She had a Colonoscopy 9/2020 that revealed internal hemorrhoids as well as a 6 mm ascending colon polyp and a 4 mm descending polyp-both hyperplastic, due in September 2025 for surveillance. I did fu EGD on the patient on February 17, 2023 to evaluate due to an MRI that mentioned a thickened stomach.  This revealed esophagitis otherwise normal exam.  Biopsies were sent.  Pathology showed chronic gastritis of the antrum but no H. pylori, chronic gastritis of the body but no H. pylori and esophageal biopsy showed no abnormalities both in the distal and mid areas of the esophagus.  Pt takes meloxicam for her knee. Pt admits to having stopped amitiza and she is bloated and passing hard stools.  She thinks amitiza works but has diarrhea sometimes from it so stopped. Pts dad had pancreatic cancer. Pt has thought about having a CT scan just to ensure no other issues. Pts son is a radiologist and had kidney stone. Pt had genetic testing and negative. Pt had failed colace and miralax. Patient had a CT scan done on July 15, 2024 and this revealed no abnormalities except for some degenerative disc disease of the lumbar spine.  Patient was to try Linzess after her last visit because she felt that Amitiza was not working and she failed over-the-counter meds for her chronic constipation. Pt did not get lubiprostone 8ug and she takes it once a day and goes with harder stools (debora) but at least she is evacuating more than when she tool otc laxatives. Pt goes everyday. Pt is taking meloxicam for arthritis and she is on panto. Pt takes meloxicam for her arthritis.  Pt was seen fo tele in 11/2024 and had 2 episodes of impactions so I rec lubiprostone as she was worried about diarrhea from linzess. Pt had to go to urgent care and they did xrays and told her to fu with GI. On Sunday- started to feel backed up and took an enema and it did not work. Has pain on left side now. Pt thinks lactulose helps some but she still does not feel complete evacuation. Admits was not taking lubiprostone. Pt eats less when gets backed up. No fevers. Xrays done at urgent care- normal limits, normal colon stool burden.

## 2025-03-21 NOTE — PHYSICAL EXAM GASTROINTESTINAL
Abdomen , soft, tender but no rebound , nondistended , no guarding or rigidity , no masses palpable , normal bowel sounds , Liver and Spleen , no hepatomegaly present , no hepatosplenomegaly , liver nontender , spleen not palpable  umbilical hernia

## 2025-03-24 ENCOUNTER — LAB OUTSIDE AN ENCOUNTER (OUTPATIENT)
Dept: URBAN - METROPOLITAN AREA CLINIC 96 | Facility: CLINIC | Age: 80
End: 2025-03-24

## 2025-03-24 LAB
CREATININE POC: 0.9
PERFORMING LAB: (no result)

## 2025-04-09 ENCOUNTER — OFFICE VISIT (OUTPATIENT)
Dept: URBAN - METROPOLITAN AREA CLINIC 96 | Facility: CLINIC | Age: 80
End: 2025-04-09

## 2025-04-16 ENCOUNTER — LAB OUTSIDE AN ENCOUNTER (OUTPATIENT)
Dept: URBAN - METROPOLITAN AREA CLINIC 96 | Facility: CLINIC | Age: 80
End: 2025-04-16

## 2025-04-16 ENCOUNTER — OFFICE VISIT (OUTPATIENT)
Dept: URBAN - METROPOLITAN AREA CLINIC 96 | Facility: CLINIC | Age: 80
End: 2025-04-16
Payer: MEDICARE

## 2025-04-16 DIAGNOSIS — R11.0 NAUSEA: ICD-10-CM

## 2025-04-16 DIAGNOSIS — K21.9 GASTROESOPHAGEAL REFLUX DISEASE WITHOUT ESOPHAGITIS: ICD-10-CM

## 2025-04-16 DIAGNOSIS — R10.84 GENERALIZED ABDOMINAL PAIN: ICD-10-CM

## 2025-04-16 DIAGNOSIS — Z80.0 FAMILY HISTORY OF PANCREATIC CANCER: ICD-10-CM

## 2025-04-16 DIAGNOSIS — K59.04 CHRONIC IDIOPATHIC CONSTIPATION: ICD-10-CM

## 2025-04-16 DIAGNOSIS — Z86.0102 PERSONAL HISTORY OF HYPERPLASTIC COLON POLYPS: ICD-10-CM

## 2025-04-16 PROCEDURE — 99204 OFFICE O/P NEW MOD 45 MIN: CPT | Performed by: INTERNAL MEDICINE

## 2025-04-16 PROCEDURE — 99214 OFFICE O/P EST MOD 30 MIN: CPT | Performed by: INTERNAL MEDICINE

## 2025-04-16 RX ORDER — ONDANSETRON HYDROCHLORIDE 4 MG/1
1 TABLET TABLET, FILM COATED ORAL ONCE A DAY
Qty: 14 TABLET | Refills: 1 | OUTPATIENT
Start: 2025-04-16

## 2025-04-16 RX ORDER — ESZOPICLONE 2 MG/1
TABLET, FILM COATED ORAL
Qty: 30 TABLET | Status: ACTIVE | COMMUNITY

## 2025-04-16 RX ORDER — POLYETHYLENE GLYCOL 3350, SODIUM SULFATE ANHYDROUS, SODIUM BICARBONATE, SODIUM CHLORIDE, POTASSIUM CHLORIDE 236; 22.74; 6.74; 5.86; 2.97 G/4L; G/4L; G/4L; G/4L; G/4L
AS DIRECTED POWDER, FOR SOLUTION ORAL
Qty: 4000 ML | Refills: 0 | OUTPATIENT
Start: 2025-04-16 | End: 2025-04-17

## 2025-04-16 RX ORDER — LUBIPROSTONE 8 UG/1
1 CAPSULE WITH FOOD AND WATER CAPSULE, GELATIN COATED ORAL
Qty: 180 CAPSULES | Refills: 3 | OUTPATIENT
Start: 2025-04-15 | End: 2026-04-10

## 2025-04-16 RX ORDER — ONDANSETRON HYDROCHLORIDE 4 MG/1
2 TABLETS TABLET, FILM COATED ORAL
Qty: 2 TABLETS | Refills: 0 | OUTPATIENT
Start: 2025-04-16

## 2025-04-16 RX ORDER — PANTOPRAZOLE SODIUM 40 MG/1
TABLET, DELAYED RELEASE ORAL
Qty: 90 TABLET | Status: ACTIVE | COMMUNITY

## 2025-04-16 RX ORDER — MELOXICAM 15 MG/1
1 TABLET TABLET ORAL ONCE A DAY
Status: ON HOLD | COMMUNITY

## 2025-04-16 RX ORDER — SERTRALINE HYDROCHLORIDE 50 MG/1
TABLET ORAL
Qty: 90 TABLET | Status: ACTIVE | COMMUNITY

## 2025-04-16 RX ORDER — METOPROLOL TARTRATE 25 MG/1
TABLET ORAL
Qty: 1 TABLET | Status: ACTIVE | COMMUNITY

## 2025-04-16 RX ORDER — LACTULOSE 10 G/15ML
SOLUTION ORAL
Qty: 600 MILLILITER | Status: ACTIVE | COMMUNITY

## 2025-04-16 RX ORDER — HYDROCHLOROTHIAZIDE 12.5 MG/1
CAPSULE ORAL
Qty: 90 CAPSULE | Status: ACTIVE | COMMUNITY

## 2025-04-16 NOTE — HPI-TODAY'S VISIT:
This is a 81yo F who presents for Gi f/u and a copy will be sent to the ref provider, Dr Yuki Zamora. Pt with hx of severe constipation and gerd was seen in april 2023 and was on amitiza at the time. We discussed doing ARM study to r/o pelvic floor dysfunction. EGD 9/4/2020  revealed a 4 mm squamous papilloma in the proximal esophagus this was removed and gastritis. Bx neg celiac and HP but showed some reflux esophagitis.  She took PPI short term and sx resolved. She is off reflux meds and denies GERD sx--controls this with diet. No dysphagia, N/V. She had a Colonoscopy 9/2020 that revealed internal hemorrhoids as well as a 6 mm ascending colon polyp and a 4 mm descending polyp-both hyperplastic, due in September 2025 for surveillance. I did fu EGD on the patient on February 17, 2023 to evaluate due to an MRI that mentioned a thickened stomach.  This revealed esophagitis otherwise normal exam.  Biopsies were sent.  Pathology showed chronic gastritis of the antrum but no H. pylori, chronic gastritis of the body but no H. pylori and esophageal biopsy showed no abnormalities both in the distal and mid areas of the esophagus. Pts dad had pancreatic cancer. Pt has thought about having a CT scan just to ensure no other issues. Pts son is a radiologist and had kidney stone. Pt had genetic testing and negative. Pt had failed colace and miralax. Patient had a CT scan done on July 15, 2024 and this revealed no abnormalities except for some degenerative disc disease of the lumbar spine.  Patient was to try Linzess after her last visit because she felt that Amitiza was not working and she failed over-the-counter meds for her chronic constipation. Pt did not get lubiprostone 8ug and she takes it once a day and goes with harder stools (debora) but at least she is evacuating more than when she tool otc laxatives. Pt goes everyday. Pt is taking meloxicam for arthritis and she is on panto. Pt takes meloxicam for her arthritis. Pt was seen fo tele in 11/2024 and had 2 episodes of impactions so I rec lubiprostone as she was worried about diarrhea from linzess. Pt had to go to urgent care and they did xrays and told her to fu with GI. Had pain on left side now. Pt thinks lactulose helps some but she still does not feel complete evacuation. Admits was not taking lubiprostone. Pt eats less when gets backed up. No fevers. Xrays done at urgent care- normal limits, normal colon stool burden.  Todays visit- Pt was seen in March 2025- had urgent care visit due to constipation- long issue for her. Had not been on lubiprostone. CT scan abdomen pelvis with oral and IV contrast was done on March 21, 2025 and revealed no acute intra-abdominal process.  There is a small umbilical hernia.  There is a moderate amount of retained stool in the colon. Pt had a bad GI bug and was in [facility]. Had temps, n,v, had diarrhea and now getting back to normal. Pt had a BM last on Saturday. Pt has some nausea but no other issues now.

## 2025-04-23 ENCOUNTER — WEB ENCOUNTER (OUTPATIENT)
Dept: URBAN - METROPOLITAN AREA CLINIC 96 | Facility: CLINIC | Age: 80
End: 2025-04-23